# Patient Record
Sex: FEMALE | URBAN - METROPOLITAN AREA
[De-identification: names, ages, dates, MRNs, and addresses within clinical notes are randomized per-mention and may not be internally consistent; named-entity substitution may affect disease eponyms.]

---

## 2022-02-01 ENCOUNTER — OFFICE VISIT (OUTPATIENT)
Dept: URBAN - METROPOLITAN AREA CLINIC 89 | Facility: CLINIC | Age: 62
End: 2022-02-01
Payer: MEDICAID

## 2022-02-01 DIAGNOSIS — H25.22 AGE-RELATED CATARACT, MORGANIAN TYPE, LEFT EYE: ICD-10-CM

## 2022-02-01 DIAGNOSIS — E11.3551 DIABETES MELLITUS TYPE 2 WITH STABLE PROLIFERATIVE RETINOPATHY, RIGHT EYE: ICD-10-CM

## 2022-02-01 DIAGNOSIS — H25.89 OTHER AGE-RELATED CATARACT: Primary | ICD-10-CM

## 2022-02-01 PROCEDURE — 99204 OFFICE O/P NEW MOD 45 MIN: CPT | Performed by: OPTOMETRIST

## 2022-02-01 ASSESSMENT — INTRAOCULAR PRESSURE
OS: 14
OD: 17

## 2022-02-01 NOTE — IMPRESSION/PLAN
Impression: Age-related cataract, morganian type, left eye: H25.22. Plan: Patient has complaints consistent with visually significant cataracts. Will proceed with cataract surgery LEFT EYE ONLY. Discussed the risks, benefits, and alternatives of cataract surgery. Given that we almost never use retrobulbar anesthesia, there is typically no need to stop any anticoagulant medications when a patient gets cataract surgery with us. In most cataract surgeries performed by us we place an antibiotic and steroid at the time of surgery so most likely will not need to use any prescription post-op drops. The patient stated a full understanding and a desire to proceed with the procedure. Biometry ordered for intraocular lens selection. Advised against driving until complete. Reviewed the increased risk of retinal detachment after cataract surgery and reviewed retinal detachment and general warnings and to contact us immediately should any of those develop. Plan for cataract surgery LEFT EYE ONLY [CE OS ONLY]. MOCT, glare testing and refraction NOT performed due to severity of cataract REQUIRES B-SCAN

## 2022-02-01 NOTE — IMPRESSION/PLAN
Impression: Age-related cataract, morganian type, left eye: H25.22. Plan: Discussed with the patient that underlying conditions may limit vision following cataract surgery.

## 2022-02-01 NOTE — IMPRESSION/PLAN
Impression: Diabetes mellitus Type 2 with stable proliferative retinopathy, right eye: U03.4396. Plan: Poor view OD. No view OS.   Plan to dilate OU at 1 week postoperative visit OS

## 2022-02-01 NOTE — IMPRESSION/PLAN
Impression: Other age-related cataract: H25.89. Plan: Discussion with patient. I have advised her that her underlying PDR and PRP treatments will limit visual outcome, however it will be improved. Patient expressed understanding. 

Schedule YAG Capsulotomy OD with Geneva Powell MD

## 2023-02-02 ENCOUNTER — OFFICE VISIT (OUTPATIENT)
Dept: URBAN - METROPOLITAN AREA CLINIC 89 | Facility: CLINIC | Age: 63
End: 2023-02-02
Payer: MEDICAID

## 2023-02-02 DIAGNOSIS — H25.22 AGE-RELATED CATARACT, MORGANIAN TYPE, LEFT EYE: Primary | ICD-10-CM

## 2023-02-02 DIAGNOSIS — E11.3551 DIABETES MELLITUS TYPE 2 WITH STABLE PROLIFERATIVE RETINOPATHY, RIGHT EYE: ICD-10-CM

## 2023-02-02 DIAGNOSIS — H25.89 OTHER AGE-RELATED CATARACT: ICD-10-CM

## 2023-02-02 PROCEDURE — 99214 OFFICE O/P EST MOD 30 MIN: CPT | Performed by: OPTOMETRIST

## 2023-02-02 ASSESSMENT — INTRAOCULAR PRESSURE
OS: 15
OD: 19

## 2023-02-02 NOTE — IMPRESSION/PLAN
Impression: Age-related cataract, morganian type, left eye: H25.22. Plan: Patient has complaints consistent with visually significant cataracts. Will proceed with cataract surgery LEFT EYE ONLY. Discussed the risks, benefits, and alternatives of cataract surgery. Given that we almost never use retrobulbar anesthesia, there is typically no need to stop any anticoagulant medications when a patient gets cataract surgery with us. In most cataract surgeries performed by us we place an antibiotic and steroid at the time of surgery so most likely will not need to use any prescription post-op drops. The patient stated a full understanding and a desire to proceed with the procedure. Biometry ordered for intraocular lens selection. Advised against driving until complete. Reviewed the increased risk of retinal detachment after cataract surgery and reviewed retinal detachment and general warnings and to contact us immediately should any of those develop. Plan for cataract surgery LEFT EYE ONLY [CE OS ONLY].   MOCT, glare testing and refraction NOT performed due to severity

## 2023-02-02 NOTE — IMPRESSION/PLAN
Impression: Diabetes mellitus Type 2 with stable proliferative retinopathy, right eye: E29.4506. Plan: Poor view OD. No view OS.   Plan to dilate OU at 1 week postoperative visit OS

## 2023-02-02 NOTE — IMPRESSION/PLAN
Impression: Other age-related cataract: H25.89. Plan: Posterior capsular opacity OD (H26.491) (common clouding behind the intraocular lens implant) - the risks, benefits, and alternatives of YAG laser capsulotomy were discussed with the patient, who stated an understanding of the above, having all questions answered, and a desire to proceed with the procedure.  

Schedule YAG OD only

## 2023-04-25 ENCOUNTER — OFFICE VISIT (OUTPATIENT)
Dept: URBAN - METROPOLITAN AREA CLINIC 88 | Facility: CLINIC | Age: 63
End: 2023-04-25
Payer: MEDICAID

## 2023-04-25 DIAGNOSIS — H26.491 OTHER SECONDARY CATARACT, RIGHT EYE: ICD-10-CM

## 2023-04-25 DIAGNOSIS — H25.22 AGE-RELATED CATARACT, MORGANIAN TYPE, LEFT EYE: Primary | ICD-10-CM

## 2023-04-25 DIAGNOSIS — Z96.1 PRESENCE OF INTRAOCULAR LENS: ICD-10-CM

## 2023-04-25 PROCEDURE — 76512 OPH US DX B-SCAN: CPT | Performed by: OPHTHALMOLOGY

## 2023-04-25 PROCEDURE — 92004 COMPRE OPH EXAM NEW PT 1/>: CPT | Performed by: OPHTHALMOLOGY

## 2023-04-25 ASSESSMENT — INTRAOCULAR PRESSURE
OS: 17
OD: 18

## 2023-04-25 NOTE — IMPRESSION/PLAN
Impression: Age-related cataract, morganian type, left eye: H25.22. Plan: Cataracts appear to be dense enough to account for patient's vision. However, with decreased view into the fundus cannot determine if there is any retinal pathology which may prohibit improved vision post operatively. Patient wishes to proceed with surgery, recommend phacoemulsification with intraocular lens. Schedule Cataract sx OS.

## 2023-04-25 NOTE — IMPRESSION/PLAN
Impression: Other secondary cataract, right eye: H26.491. Plan: PC haze account for the patient's complaints. Patient understands changing glasses will not improve vision. Patient desires to have YAG Laser surgery. Surgical risks and benefits were discussed, explained and to patient.  RL2